# Patient Record
Sex: FEMALE | Race: BLACK OR AFRICAN AMERICAN | NOT HISPANIC OR LATINO | Employment: FULL TIME | URBAN - METROPOLITAN AREA
[De-identification: names, ages, dates, MRNs, and addresses within clinical notes are randomized per-mention and may not be internally consistent; named-entity substitution may affect disease eponyms.]

---

## 2021-12-23 ENCOUNTER — HISTORICAL (OUTPATIENT)
Dept: ADMINISTRATIVE | Facility: HOSPITAL | Age: 61
End: 2021-12-23

## 2021-12-23 LAB — SARS-COV-2 RNA RESP QL NAA+PROBE: NOT DETECTED

## 2021-12-30 ENCOUNTER — HISTORICAL (OUTPATIENT)
Dept: ADMINISTRATIVE | Facility: HOSPITAL | Age: 61
End: 2021-12-30

## 2021-12-30 LAB — SARS-COV-2 RNA RESP QL NAA+PROBE: NOT DETECTED

## 2022-07-23 ENCOUNTER — HOSPITAL ENCOUNTER (EMERGENCY)
Facility: HOSPITAL | Age: 62
Discharge: HOME | End: 2022-07-23
Attending: EMERGENCY MEDICINE
Payer: MEDICAID

## 2022-07-23 VITALS
RESPIRATION RATE: 18 BRPM | BODY MASS INDEX: 20.42 KG/M2 | DIASTOLIC BLOOD PRESSURE: 73 MMHG | HEART RATE: 81 BPM | WEIGHT: 104 LBS | SYSTOLIC BLOOD PRESSURE: 113 MMHG | HEIGHT: 60 IN | OXYGEN SATURATION: 97 % | TEMPERATURE: 98 F

## 2022-07-23 DIAGNOSIS — J98.8 VIRAL RESPIRATORY ILLNESS: Primary | ICD-10-CM

## 2022-07-23 DIAGNOSIS — B97.89 VIRAL RESPIRATORY ILLNESS: Primary | ICD-10-CM

## 2022-07-23 DIAGNOSIS — J34.89 TENDERNESS OVER FRONTAL SINUS: ICD-10-CM

## 2022-07-23 LAB
FLUAV AG UPPER RESP QL IA.RAPID: NOT DETECTED
FLUBV AG UPPER RESP QL IA.RAPID: NOT DETECTED
SARS-COV-2 RNA RESP QL NAA+PROBE: NOT DETECTED

## 2022-07-23 PROCEDURE — 87636 SARSCOV2 & INF A&B AMP PRB: CPT | Performed by: EMERGENCY MEDICINE

## 2022-07-23 PROCEDURE — 99282 EMERGENCY DEPT VISIT SF MDM: CPT

## 2022-07-23 NOTE — ED PROVIDER NOTES
Encounter Date: 7/23/2022       History     Chief Complaint   Patient presents with    COVID-19 Concerns     Pt c/o fever, sinus congestion, h/a and bodyaches onset 5 days ago.     61 year old female with flu like symptoms since a few days ago comes in to rule out covid. She states she was exposed a few days ago. She had a fever and a bad headache.         Review of patient's allergies indicates:   Allergen Reactions    Iodine     Penicillins Rash     No past medical history on file.  No past surgical history on file.  No family history on file.     Review of Systems   Constitutional: Negative for fever.   HENT: Positive for congestion, postnasal drip and sinus pressure. Negative for ear discharge, ear pain and sore throat.    Respiratory: Negative for shortness of breath.    Cardiovascular: Negative for chest pain.   Gastrointestinal: Negative for nausea.   Genitourinary: Negative for dysuria.   Musculoskeletal: Negative for back pain.   Skin: Negative for rash.   Neurological: Negative for weakness.   Hematological: Does not bruise/bleed easily.       Physical Exam     Initial Vitals [07/23/22 1221]   BP Pulse Resp Temp SpO2   113/73 81 18 97.5 °F (36.4 °C) 97 %      MAP       --         Physical Exam    Nursing note and vitals reviewed.  Constitutional: She appears well-developed and well-nourished.   HENT:   Right Ear: Tympanic membrane normal. No tenderness. Tympanic membrane is not perforated, not retracted and not bulging.   Left Ear: Tympanic membrane normal. No tenderness. Tympanic membrane is not retracted.   Cardiovascular: Normal rate.   Pulmonary/Chest: Breath sounds normal. No respiratory distress. She has no wheezes. She has no rales.     Neurological: She is alert and oriented to person, place, and time.   Skin: Skin is warm.   Psychiatric: She has a normal mood and affect. Thought content normal.         ED Course   Procedures  Labs Reviewed   COVID/FLU A&B PCR - Normal          Imaging Results     None          Medications - No data to display  Medical Decision Making:   Initial Assessment:   Flue like symptoms    Differential Diagnosis:   Viral illness    Clinical Tests:   Lab Tests: Reviewed             ED Course as of 07/23/22 1347   Sat Jul 23, 2022   1346 Influenza A, Molecular: Not Detected [YG]   1346 Influenza B, Molecular: Not Detected [YG]   1346 SARS-CoV2 (COVID-19) Qualitative PCR: Not Detected [YG]      ED Course User Index  [YG] ANDERS Bucio             Clinical Impression:   Final diagnoses:  [J98.8, B97.89] Viral respiratory illness (Primary)  [J34.89] Tenderness over frontal sinus          ED Disposition Condition    Discharge Stable        ED Prescriptions     None        Follow-up Information     Follow up With Specialties Details Why Contact Info    [facility] - Emergency Dept Emergency Medicine  If symptoms worsen [phone]  [facility] [id number] [phone]           ANDERS Bucio  07/23/22 5395

## 2023-03-25 ENCOUNTER — HOSPITAL ENCOUNTER (EMERGENCY)
Facility: HOSPITAL | Age: 63
Discharge: HOME | End: 2023-03-25
Attending: EMERGENCY MEDICINE
Payer: MEDICAID

## 2023-03-25 VITALS
RESPIRATION RATE: 18 BRPM | DIASTOLIC BLOOD PRESSURE: 86 MMHG | HEIGHT: 60 IN | HEART RATE: 96 BPM | WEIGHT: 110 LBS | TEMPERATURE: 100 F | BODY MASS INDEX: 21.6 KG/M2 | SYSTOLIC BLOOD PRESSURE: 140 MMHG | OXYGEN SATURATION: 98 %

## 2023-03-25 DIAGNOSIS — J06.9 UPPER RESPIRATORY TRACT INFECTION, UNSPECIFIED TYPE: ICD-10-CM

## 2023-03-25 DIAGNOSIS — H61.22 HEARING LOSS DUE TO CERUMEN IMPACTION, LEFT: Primary | ICD-10-CM

## 2023-03-25 PROCEDURE — 0240U COVID/FLU A&B PCR: CPT | Performed by: EMERGENCY MEDICINE

## 2023-03-25 PROCEDURE — 99282 EMERGENCY DEPT VISIT SF MDM: CPT

## 2023-03-25 RX ORDER — CYPROHEPTADINE HYDROCHLORIDE 4 MG/1
4 TABLET ORAL 2 TIMES DAILY
COMMUNITY
Start: 2023-03-24

## 2023-03-25 RX ORDER — AMLODIPINE BESYLATE 2.5 MG/1
2.5 TABLET ORAL
COMMUNITY
Start: 2023-03-21

## 2023-03-25 RX ORDER — IBUPROFEN 600 MG/1
600 TABLET ORAL 3 TIMES DAILY
COMMUNITY
Start: 2023-02-02 | End: 2024-01-10 | Stop reason: SDUPTHER

## 2023-03-25 RX ORDER — ERGOCALCIFEROL 1.25 MG/1
50000 CAPSULE ORAL
COMMUNITY
Start: 2023-03-21

## 2023-03-25 NOTE — Clinical Note
"Becky"Mercedes Herndon was seen and treated in our emergency department on 3/25/2023.  She may return to work on 03/28/2023.       If you have any questions or concerns, please don't hesitate to call.      Minal Escalera MD"

## 2023-03-26 NOTE — ED PROVIDER NOTES
Encounter Date: 3/25/2023       History     Chief Complaint   Patient presents with    Cough     Cough [place]ce x1 week      62-year-old female complains of a one-week history of cough, chest congestion, body aches.  She has had no fever.  No sore throat or runny nose.  No ear pain.  She states that she quit smoking about a week and a half ago and thinks that she is having withdrawal symptoms.      Review of patient's allergies indicates:   Allergen Reactions    Iodine     Penicillins Rash     Past Medical History:   Diagnosis Date    Hypertension      Past Surgical History:   Procedure Laterality Date    cyst on vocal chord       No family history on file.  Social History     Tobacco Use    Smoking status: Every Day     Types: Cigarettes    Smokeless tobacco: Never   Substance Use Topics    Alcohol use: Yes    Drug use: Never     Review of Systems   HENT:  Positive for congestion.    Respiratory:  Positive for cough.    Musculoskeletal:  Positive for myalgias.   All other systems reviewed and are negative.    Physical Exam     Initial Vitals [03/25/23 2103]   BP Pulse Resp Temp SpO2   (!) 140/86 96 18 99.9 °F (37.7 °C) 98 %      MAP       --         Physical Exam    Nursing note and vitals reviewed.  Constitutional: She appears well-developed and well-nourished. She is not diaphoretic. No distress.   HENT:   Head: Normocephalic and atraumatic.   Mouth/Throat: Oropharynx is clear and moist.   Right TM is normal, left TM not visualized due to cerumen impaction   Eyes: Conjunctivae are normal. Pupils are equal, round, and reactive to light.   Neck: Neck supple.   Cardiovascular:  Normal rate, regular rhythm, normal heart sounds and intact distal pulses.           Pulmonary/Chest: Breath sounds normal. No respiratory distress. She has no wheezes. She has no rhonchi. She has no rales.   Abdominal: Abdomen is soft. Bowel sounds are normal. She exhibits no distension. There is no abdominal tenderness. There  is no guarding.   Musculoskeletal:         General: No tenderness or edema. Normal range of motion.      Cervical back: Neck supple.     Neurological: She is alert and oriented to person, place, and time.   Skin: Skin is warm and dry. Capillary refill takes less than 2 seconds. No rash noted.   Psychiatric: She has a normal mood and affect. Thought content normal.       ED Course   Procedures  Labs Reviewed   COVID/FLU A&B PCR - Normal    Narrative:     The Xpert Xpress SARS-CoV-2/FLU/RSV plus is a rapid, multiplexed real-time PCR test intended for the simultaneous qualitative detection and differentiation of SARS-CoV-2, Influenza A, Influenza B, and respiratory syncytial virus (RSV) viral RNA in either nasopharyngeal swab or nasal swab specimens.                Imaging Results    None          Medications - No data to display  Medical Decision Making:   Initial Assessment:   62-year-old female complains of a one-week history of cough, chest congestion, body aches.  She has had no fever.  No sore throat or runny nose.  No ear pain.  She states that she quit smoking about a week and a half ago and thinks that she is having withdrawal symptoms.    Differential Diagnosis:   Differential diagnosis includes but is not limited to URI, nicotine withdrawal, COVID, flu  Clinical Tests:   Lab Tests: Reviewed  ED Management:  Patient was seen and evaluated in the emergency room with history, physical exam, COVID test, flu test.  Her exam is benign other than a cerumen impaction on the left.  COVID and flu test are negative.                        Clinical Impression:   Final diagnoses:  [H61.22] Hearing loss due to cerumen impaction, left (Primary)  [J06.9] Upper respiratory tract infection, unspecified type        ED Disposition Condition    Discharge Stable          ED Prescriptions       Medication Sig Dispense Start Date End Date Auth. Provider    carbamide peroxide (DEBROX) 6.5 % otic solution Place 5 drops into the left  ear as needed (ear wax obstruction). 15 mL 3/25/2023 4/1/2023 Minal Escalera MD          Follow-up Information       Follow up With Specialties Details Why Contact Info    PCP  Schedule an appointment as soon as possible for a visit                Minal Escalera MD  03/26/23 4183

## 2023-05-25 ENCOUNTER — HOSPITAL ENCOUNTER (EMERGENCY)
Facility: HOSPITAL | Age: 63
Discharge: HOME | End: 2023-05-25
Attending: EMERGENCY MEDICINE
Payer: MEDICAID

## 2023-05-25 VITALS
SYSTOLIC BLOOD PRESSURE: 115 MMHG | DIASTOLIC BLOOD PRESSURE: 69 MMHG | BODY MASS INDEX: 23.56 KG/M2 | HEIGHT: 60 IN | WEIGHT: 120 LBS | OXYGEN SATURATION: 97 % | TEMPERATURE: 97 F | RESPIRATION RATE: 18 BRPM | HEART RATE: 78 BPM

## 2023-05-25 DIAGNOSIS — J06.9 VIRAL URI WITH COUGH: Primary | ICD-10-CM

## 2023-05-25 PROCEDURE — 99284 EMERGENCY DEPT VISIT MOD MDM: CPT

## 2023-05-25 PROCEDURE — 0240U COVID/FLU A&B PCR: CPT | Performed by: EMERGENCY MEDICINE

## 2023-05-25 RX ORDER — AZELASTINE 1 MG/ML
1 SPRAY, METERED NASAL 2 TIMES DAILY
Qty: 30 ML | Refills: 0 | Status: SHIPPED | OUTPATIENT
Start: 2023-05-25 | End: 2024-05-24

## 2023-05-25 RX ORDER — CHLOPHEDIANOL HCL AND PYRILAMINE MALEATE 12.5; 12.5 MG/5ML; MG/5ML
5 SOLUTION ORAL 2 TIMES DAILY PRN
Qty: 120 ML | Refills: 0 | Status: SHIPPED | OUTPATIENT
Start: 2023-05-25

## 2023-05-25 NOTE — ED PROVIDER NOTES
Encounter Date: 5/25/2023       History     Chief Complaint   Patient presents with    Cough     Pt to er c/o cough and congestion onset 3 weeks ago, along with h/a.     62-year-old female complains of a 2 week history of cough, nasal congestion, left ear pain, chest congestion.  She says she saw her PCP but I do not see any prescription on her medication history.  She checked into the emergency room with her 6 grandchildren who all have the same symptoms.  She denies fever, chills, chest pain, shortness of breath, nausea, vomiting, malaise.  Appetite is normal.      Review of patient's allergies indicates:   Allergen Reactions    Iodine     Penicillins Rash     Past Medical History:   Diagnosis Date    Hypertension      Past Surgical History:   Procedure Laterality Date    cyst on vocal chord       No family history on file.  Social History     Tobacco Use    Smoking status: Every Day     Types: Cigarettes    Smokeless tobacco: Never   Substance Use Topics    Alcohol use: Yes    Drug use: Never     Review of Systems   HENT:  Positive for congestion and ear pain.    Respiratory:  Positive for cough.    All other systems reviewed and are negative.    Physical Exam     Initial Vitals [05/25/23 0906]   BP Pulse Resp Temp SpO2   115/69 78 18 97 °F (36.1 °C) 97 %      MAP       --         Physical Exam    Nursing note and vitals reviewed.  Constitutional: She appears well-developed and well-nourished. She is not diaphoretic. No distress.   HENT:   Head: Normocephalic and atraumatic.   Mouth/Throat: Oropharynx is clear and moist.   Right TM is normal, left ear has a cerumen impaction   Eyes: Conjunctivae are normal. Pupils are equal, round, and reactive to light.   Neck: Neck supple.   Cardiovascular:  Normal rate, regular rhythm, normal heart sounds and intact distal pulses.           Pulmonary/Chest: Breath sounds normal. No respiratory distress. She has no wheezes. She has no rhonchi. She has no rales.   Abdominal:  Abdomen is soft. Bowel sounds are normal. She exhibits no distension. There is no abdominal tenderness. There is no guarding.   Musculoskeletal:         General: No tenderness or edema. Normal range of motion.      Cervical back: Neck supple.     Neurological: She is alert and oriented to person, place, and time.   Skin: Skin is warm and dry. Capillary refill takes less than 2 seconds. No rash noted.   Psychiatric: She has a normal mood and affect. Thought content normal.       ED Course   Procedures  Labs Reviewed   COVID/FLU A&B PCR - Normal    Narrative:     The Xpert Xpress SARS-CoV-2/FLU/RSV plus is a rapid, multiplexed real-time PCR test intended for the simultaneous qualitative detection and differentiation of SARS-CoV-2, Influenza A, Influenza B, and respiratory syncytial virus (RSV) viral RNA in either nasopharyngeal swab or nasal swab specimens.                Imaging Results    None          Medications - No data to display  Medical Decision Making:   Initial Assessment:   62-year-old female complains of a 2 week history of cough, nasal congestion, left ear pain, chest congestion.  She says she saw her PCP but I do not see any prescription on her medication history.  She checked into the emergency room with her 6 grandchildren who all have the same symptoms.  She denies fever, chills, chest pain, shortness of breath, nausea, vomiting, malaise.  Appetite is normal.      Differential Diagnosis:   Differential diagnosis includes but is not limited to viral syndrome, cerumen impaction, ear infection, sinusitis  Clinical Tests:   Lab Tests: Reviewed  ED Management:  Patient was seen and evaluated in the emergency department with history, physical exam, testing for COVID, flu, RSV.  She was found to have a cerumen impaction on the left which was noted during her previous ER visit in March.  I will give her prescription for Debrox and recommend that she follow-up with her PCP for further care.  I will also give  her symptomatic medications for her upper respiratory infection.  I discussed with her reasons to return to the emergency department versus going to her PCP for care.                        Clinical Impression:   Final diagnoses:  [J06.9] Viral URI with cough (Primary)        ED Disposition Condition    Discharge Stable          ED Prescriptions       Medication Sig Dispense Start Date End Date Auth. Provider    pyrilamine-chlophedianoL (NINJACOF) 12.5-12.5 mg/5 mL Liqd Take 5 mLs by mouth 2 (two) times daily as needed (cough). 120 mL 5/25/2023 -- Minal Escalera MD    azelastine (ASTELIN) 137 mcg (0.1 %) nasal spray 1 spray (137 mcg total) by Nasal route 2 (two) times daily. 30 mL 5/25/2023 5/24/2024 Minal Escalera MD    carbamide peroxide (DEBROX) 6.5 % otic solution Place 5 drops into the left ear 2 (two) times daily. for 7 days 15 mL 5/25/2023 6/1/2023 Minal Escalera MD          Follow-up Information       Follow up With Specialties Details Why Contact Info    [facility]  As needed [address] [facility] [address] [phone]               Minal Escalera MD  05/26/23 0164

## 2023-07-03 ENCOUNTER — HOSPITAL ENCOUNTER (EMERGENCY)
Facility: HOSPITAL | Age: 63
Discharge: HOME | End: 2023-07-03
Attending: STUDENT IN AN ORGANIZED HEALTH CARE EDUCATION/TRAINING PROGRAM
Payer: MEDICAID

## 2023-07-03 VITALS
SYSTOLIC BLOOD PRESSURE: 140 MMHG | WEIGHT: 102 LBS | DIASTOLIC BLOOD PRESSURE: 87 MMHG | OXYGEN SATURATION: 97 % | RESPIRATION RATE: 18 BRPM | HEART RATE: 70 BPM | BODY MASS INDEX: 20.03 KG/M2 | HEIGHT: 60 IN | TEMPERATURE: 99 F

## 2023-07-03 DIAGNOSIS — T63.441A BEE STING, ACCIDENTAL OR UNINTENTIONAL, INITIAL ENCOUNTER: Primary | ICD-10-CM

## 2023-07-03 PROCEDURE — 99282 EMERGENCY DEPT VISIT SF MDM: CPT

## 2023-07-04 NOTE — ED PROVIDER NOTES
Encounter Date: 7/3/2023       History     Chief Complaint   Patient presents with    Insect Bite     HPI    62-year-old female with a past medical history of hypertension presents emergency department after getting bit by a bug.  Patient was not sure exactly with the bug was so came into emergency department to make sure it was not poison this.  She states that she never got bit by this type insect before.  She thinks it to be but never seen one before.  States it better in the right axilla.  No pain or swelling.    Review of patient's allergies indicates:   Allergen Reactions    Iodine     Penicillins Rash     Past Medical History:   Diagnosis Date    Hypertension      Past Surgical History:   Procedure Laterality Date    cyst on vocal chord      THROAT SURGERY      cyst on vocal chord     No family history on file.  Social History     Tobacco Use    Smoking status: Every Day     Types: Cigarettes    Smokeless tobacco: Never   Substance Use Topics    Alcohol use: Yes    Drug use: Never     Review of Systems   Constitutional:  Negative for fever.   Respiratory:  Negative for cough, shortness of breath and wheezing.    Cardiovascular:  Negative for chest pain.   Gastrointestinal:  Negative for abdominal pain.   Skin:         Bug bite     Physical Exam     Initial Vitals [07/03/23 2158]   BP Pulse Resp Temp SpO2   (!) 140/87 70 18 98.5 °F (36.9 °C) 97 %      MAP       --         Physical Exam    Nursing note and vitals reviewed.  Constitutional: She appears well-developed and well-nourished. No distress.   Cardiovascular:  Normal rate and regular rhythm.           Pulmonary/Chest: Breath sounds normal. No respiratory distress.   Abdominal: Abdomen is soft. There is no abdominal tenderness.   Musculoskeletal:         General: No tenderness. Normal range of motion.     Neurological: She is alert.   Skin: Skin is warm. Capillary refill takes less than 2 seconds.   Small bug bite noted to the right axilla with mild  inflammatory changes.  No fluctuance or induration.  No residual stinger   Psychiatric: She has a normal mood and affect. Thought content normal.       ED Course   Procedures  Labs Reviewed - No data to display       Imaging Results    None          Medications - No data to display  Medical Decision Making:   Differential Diagnosis:   Bite, wasp sitting, bee sting, local reaction               Medical Decision Making  Problems Addressed:  Bee sting, accidental or unintentional, initial encounter: self-limited or minor problem    Amount and/or Complexity of Data Reviewed  External Data Reviewed: notes.               Clinical Impression:   Final diagnoses:  [T63.441A] Bee sting, accidental or unintentional, initial encounter (Primary)        ED Disposition Condition    Discharge Stable          ED Prescriptions    None       Follow-up Information       Follow up With Specialties Details Why Contact Info    [facility] Schedule an appointment as soon as possible for a visit   [address] [facility] [address] [phone]      [facility] - Emergency Dept Emergency Medicine Go to  If symptoms worsen [address]  [facility] [address] [phone]             Ramu Carrasco MD  07/03/23 6002

## 2023-12-20 ENCOUNTER — HOSPITAL ENCOUNTER (EMERGENCY)
Facility: HOSPITAL | Age: 63
Discharge: HOME | End: 2023-12-20
Attending: FAMILY MEDICINE
Payer: MEDICAID

## 2023-12-20 VITALS
OXYGEN SATURATION: 98 % | BODY MASS INDEX: 20.62 KG/M2 | DIASTOLIC BLOOD PRESSURE: 80 MMHG | TEMPERATURE: 98 F | SYSTOLIC BLOOD PRESSURE: 145 MMHG | HEART RATE: 88 BPM | HEIGHT: 60 IN | WEIGHT: 105 LBS | RESPIRATION RATE: 18 BRPM

## 2023-12-20 DIAGNOSIS — Z98.890 POST-OPERATIVE STATE: Primary | ICD-10-CM

## 2023-12-20 PROCEDURE — 99282 EMERGENCY DEPT VISIT SF MDM: CPT

## 2023-12-21 NOTE — ED PROVIDER NOTES
Encounter Date: 12/20/2023       History     Chief Complaint   Patient presents with    Post-op Problem     Pt here with c/o swelling to L side of her abd and pain. Pt had R sided inguinal hernia sx by Dr. Fagan at [facility] on 12/13. She states she has been fine until today. No meds taken today for pain. Sx sites appear to be healing well. No fevers at home. She states she just wants a dr to look at her abd and tell her if it's normal.       Patient is a 63-year-old female presents emergency room for evaluation with complaints of left upper quadrant abdominal discomfort/ bulge feeling.  Patient is status post robotic laparoscopic right inguinal hernia repair performed at [facility] on 12/13/2023 ( 7 days prior).  Patient reports that her postoperative course has gone well, with eating and drinking normally, and also having normal bowel movements, last bowel movement today.  Denies dysuria or hematuria.  Denies fever chills.    The history is provided by the patient.     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 Fagan    THROAT SURGERY      cyst on vocal chord     History reviewed. No pertinent family history.  Social History     Tobacco Use    Smoking status: Every Day     Current packs/day: 0.50     Types: Cigarettes    Smokeless tobacco: Never   Substance Use Topics    Alcohol use: Yes    Drug use: Never     Review of Systems   Constitutional:  Negative for chills, fatigue and fever.   HENT:  Negative for ear pain, rhinorrhea and sore throat.    Eyes:  Negative for photophobia and pain.   Respiratory:  Negative for cough, shortness of breath and wheezing.    Cardiovascular:  Negative for chest pain.   Gastrointestinal:  Negative for  abdominal pain, diarrhea, nausea and vomiting.   Genitourinary:  Negative for dysuria.   Neurological:  Negative for dizziness, weakness and headaches.   All other systems reviewed and are negative.      Physical Exam     Initial Vitals [12/20/23 1956]   BP Pulse Resp Temp SpO2   (!) 145/80 88 18 98.4 °F (36.9 °C) 98 %      MAP       --         Physical Exam    Nursing note and vitals reviewed.  Constitutional: She appears well-developed and well-nourished. No distress.   HENT:   Head: Normocephalic and atraumatic.   Eyes: Conjunctivae and EOM are normal. Pupils are equal, round, and reactive to light.   Neck: Neck supple.   Normal range of motion.  Cardiovascular:  Normal rate, regular rhythm, normal heart sounds and intact distal pulses.           Pulmonary/Chest: Breath sounds normal. No respiratory distress. She has no wheezes. She has no rhonchi. She has no rales.   Abdominal: Abdomen is soft. Bowel sounds are normal. She exhibits no distension. There is no abdominal tenderness.     Patient has 3 laparoscopic scars which are clean dry and intact.  Patient has mild tenderness to palpation of the right lower quadrant which should be expected from surgery, but otherwise benign abdomen.  No tenderness to palpation or discomfort in the left upper quadrant where the patient is complaining of sensation of a bulge. There is no rebound and no guarding.   Musculoskeletal:         General: Normal range of motion.      Cervical back: Normal range of motion and neck supple.     Neurological: She is alert and oriented to person, place, and time.   Skin: Skin is warm and dry. Capillary refill takes less than 2 seconds. No erythema.   Psychiatric: She has a normal mood and affect. Her behavior is normal. Judgment and thought content normal.         ED Course   Procedures  Labs Reviewed - No data to display       Imaging Results    None          Medications - No data to display  Medical Decision Making    Patient is a  63-year-old female presents emergency room in the postoperative state, postop day 7  for evaluation of a bulge type sensation in the left upper quadrant.  Physical examination is benign.  Will reach out to patient's surgeon due to patient being in the postoperative state.  Patient reports that she will call the office tomorrow in order to schedule follow-up with the surgeon.               ED Course as of 12/20/23 2025   Wed Dec 20, 2023   2016 Called Dr. Gracia Angeles to inform her that the patient was in the ED.  Stable for discharge to home.  Patient will call Dr. Gracia Angeles office in the morning to arrange followup.  Er precautions given for any acute worsening. [MW]      ED Course User Index  [MW] Rafael Graham MD                           Clinical Impression:  Final diagnoses:  [Z98.890] Post-operative state (Primary)          ED Disposition Condition    Discharge Stable          ED Prescriptions    None       Follow-up Information       Follow up With Specialties Details Why Contact Info    ANITA Hercules MD General Surgery Call  Call tomorrow to schedule appointment. [address] [facility] [address]  [phone]      [facility] - Emergency Dept Emergency Medicine  As needed, If symptoms worsen [address] [facility] [id number] [phone]    Candice Rowan, [facility]   [address] [place] [id number] [phone]               Rafael Graham MD  12/20/23 2025

## 2024-01-10 ENCOUNTER — HOSPITAL ENCOUNTER (EMERGENCY)
Facility: HOSPITAL | Age: 64
Discharge: HOME | End: 2024-01-10
Attending: FAMILY MEDICINE
Payer: MEDICAID

## 2024-01-10 VITALS
RESPIRATION RATE: 18 BRPM | DIASTOLIC BLOOD PRESSURE: 76 MMHG | SYSTOLIC BLOOD PRESSURE: 120 MMHG | TEMPERATURE: 99 F | HEART RATE: 82 BPM | OXYGEN SATURATION: 98 % | HEIGHT: 60 IN | BODY MASS INDEX: 21.01 KG/M2 | WEIGHT: 107 LBS

## 2024-01-10 DIAGNOSIS — K02.9 DENTAL CARIES: ICD-10-CM

## 2024-01-10 DIAGNOSIS — K04.7 DENTAL ABSCESS: Primary | ICD-10-CM

## 2024-01-10 PROCEDURE — 63600175 PHARM REV CODE 636 W HCPCS: Performed by: FAMILY MEDICINE

## 2024-01-10 PROCEDURE — 99284 EMERGENCY DEPT VISIT MOD MDM: CPT

## 2024-01-10 PROCEDURE — 96372 THER/PROPH/DIAG INJ SC/IM: CPT | Performed by: FAMILY MEDICINE

## 2024-01-10 RX ORDER — KETOROLAC TROMETHAMINE 30 MG/ML
30 INJECTION, SOLUTION INTRAMUSCULAR; INTRAVENOUS
Status: COMPLETED | OUTPATIENT
Start: 2024-01-10 | End: 2024-01-10

## 2024-01-10 RX ORDER — CLINDAMYCIN HYDROCHLORIDE 300 MG/1
300 CAPSULE ORAL EVERY 6 HOURS
Qty: 28 CAPSULE | Refills: 0 | Status: SHIPPED | OUTPATIENT
Start: 2024-01-10 | End: 2024-01-17

## 2024-01-10 RX ORDER — KETOROLAC TROMETHAMINE 10 MG/1
10 TABLET, FILM COATED ORAL EVERY 6 HOURS
Qty: 20 TABLET | Refills: 0 | Status: SHIPPED | OUTPATIENT
Start: 2024-01-10 | End: 2024-01-15

## 2024-01-10 RX ADMIN — KETOROLAC TROMETHAMINE 30 MG: 30 INJECTION, SOLUTION INTRAMUSCULAR; INTRAVENOUS at 11:01

## 2024-01-10 NOTE — ED PROVIDER NOTES
Encounter Date: 1/10/2024       History     Chief Complaint   Patient presents with    Dental Pain     Pt complaint of left dental pain with swelling radiating to left ear     The history is provided by the patient. No  was used.   Dental Pain  The primary symptoms include mouth pain. The symptoms began several days ago. The symptoms are unchanged. The symptoms are new. The symptoms occur constantly.   Additional symptoms include: gum swelling and ear pain. Additional symptoms do not include: dental sensitivity to temperature, purulent gums, trismus, jaw pain, facial swelling, trouble swallowing, pain with swallowing, excessive salivation, dry mouth, taste disturbance, smell disturbance, drooling, hearing loss, nosebleeds, swollen glands and fatigue.   States she called her PCP but was unable to obtain an appointment today.  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 Fagan    THROAT SURGERY      cyst on vocal chord     No family history on file.  Social History     Tobacco Use    Smoking status: Every Day     Current packs/day: 0.50     Types: Cigarettes    Smokeless tobacco: Never   Substance Use Topics    Alcohol use: Yes    Drug use: Never     Review of Systems   Constitutional:  Negative for fatigue.   HENT:  Positive for dental problem and ear pain. Negative for drooling, facial swelling, hearing loss, nosebleeds and trouble swallowing.    All other systems reviewed and are negative.      Physical Exam     Initial Vitals [01/10/24 1039]   BP Pulse Resp Temp SpO2   120/76 82 18 98.6 °F (37 °C) 98 %      MAP       --         Physical Exam    Nursing note and vitals reviewed.  Constitutional: She appears well-developed and well-nourished.    HENT:   Head: Normocephalic and atraumatic.   Right Ear: Hearing, tympanic membrane, external ear and ear canal normal.   Left Ear: Hearing, tympanic membrane, external ear and ear canal normal.   Mouth/Throat: Uvula is midline, oropharynx is clear and moist and mucous membranes are normal. No trismus in the jaw. Dental abscesses and dental caries present. No uvula swelling.       Eyes: Conjunctivae and EOM are normal. Pupils are equal, round, and reactive to light.   Neck: Neck supple.   Normal range of motion.  Cardiovascular:  Normal rate, regular rhythm, normal heart sounds and intact distal pulses.           No murmur heard.  Pulmonary/Chest: Breath sounds normal. No respiratory distress. She has no wheezes. She has no rhonchi. She has no rales.   Abdominal: Abdomen is soft. Bowel sounds are normal. She exhibits no distension. There is no abdominal tenderness. There is no rebound.   Musculoskeletal:         General: Normal range of motion.      Cervical back: Normal range of motion and neck supple.     Lymphadenopathy:     She has no cervical adenopathy.   Neurological: She is alert and oriented to person, place, and time. GCS score is 15. GCS eye subscore is 4. GCS verbal subscore is 5. GCS motor subscore is 6.   Skin: Skin is warm and dry. Capillary refill takes less than 2 seconds.         ED Course   Procedures  Labs Reviewed - No data to display       Imaging Results    None          Medications   ketorolac injection 30 mg (30 mg Intramuscular Given 1/10/24 1102)     Medical Decision Making  63-year-old female who presents for left lower dental pain, swelling, and left ear pain for the past 4-5 days.  States she tried to see her primary care doctor today but was unable to obtain an appointment.  No difficulty swallowing or eating.  Differential diagnoses including but not limited to otitis media, otitis externa, dental caries, dental abscess.    Risk  Prescription drug management.               ED Course  as of 01/10/24 1123   Wed Sung 10, 2024   1121 Pain improved with Toradol.  Discussed diagnoses of dental abscess and dental caries.  Prescriptions for clindamycin and Toradol on discharge.  Patient is allergic to penicillins.  Patient will need to follow up with PCP and dentist outpatient.  Patient voiced understanding and agreement with plan to discharge home. [DEANDRE]      ED Course User Index  [DEANDRE] Gabriel Darden MD                           Clinical Impression:  Final diagnoses:  [K04.7] Dental abscess (Primary)  [K02.9] Dental caries          ED Disposition Condition    Discharge Stable          ED Prescriptions       Medication Sig Dispense Start Date End Date Auth. Provider    ketorolac (TORADOL) 10 mg tablet Take 1 tablet (10 mg total) by mouth every 6 (six) hours. for 5 days 20 tablet 1/10/2024 1/15/2024 Gabriel Darden MD    clindamycin (CLEOCIN) 300 MG capsule Take 1 capsule (300 mg total) by mouth every 6 (six) hours. for 7 days 28 capsule 1/10/2024 1/17/2024 Gabriel Darden MD          Follow-up Information       Follow up With Specialties Details Why Contact Info    Candice Rowan FNP Family Medicine In 2 days  [address] [facility] [address] [phone]               Gabriel Darden MD  01/10/24 1123

## 2024-05-04 ENCOUNTER — HOSPITAL ENCOUNTER (EMERGENCY)
Facility: HOSPITAL | Age: 64
Discharge: HOME | End: 2024-05-04
Attending: EMERGENCY MEDICINE
Payer: MEDICAID

## 2024-05-04 VITALS
BODY MASS INDEX: 21.01 KG/M2 | HEIGHT: 60 IN | TEMPERATURE: 98 F | DIASTOLIC BLOOD PRESSURE: 80 MMHG | OXYGEN SATURATION: 99 % | HEART RATE: 77 BPM | RESPIRATION RATE: 18 BRPM | WEIGHT: 107 LBS | SYSTOLIC BLOOD PRESSURE: 141 MMHG

## 2024-05-04 DIAGNOSIS — S62.346A CLOSED NONDISPLACED FRACTURE OF BASE OF FIFTH METACARPAL BONE OF RIGHT HAND, INITIAL ENCOUNTER: Primary | ICD-10-CM
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
## 2024-05-04 PROCEDURE — 29125 APPL SHORT ARM SPLINT STATIC: CPT | Mod: RT

## 2024-05-04 PROCEDURE — 99283 EMERGENCY DEPT VISIT LOW MDM: CPT | Mod: 25

## 2024-05-04 NOTE — ED PROVIDER NOTES
Encounter Date: 5/4/2024       History     Chief Complaint   Patient presents with    Arm Pain     Pt to er c/o pain to right arm and hand s/p fall two weeks ago.     63-year-old female fell on her right arm about 2 weeks ago.  She states she has been trying to work and really trying to use her hand but the pain is limiting her.  She did not seek medical care because she thought she just had bruising and it would go away.  Most of her pain is proximal right 5th metacarpal region.  However she is hurting from her elbow all the way down to her fingers.  She denies head or neck injury.  She denies weakness or numbness.        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 Fagan    THROAT SURGERY      cyst on vocal chord     No family history on file.  Social History     Tobacco Use    Smoking status: Every Day     Current packs/day: 0.50     Types: Cigarettes    Smokeless tobacco: Never   Substance Use Topics    Alcohol use: Yes    Drug use: Never     Review of Systems   Musculoskeletal:  Positive for arthralgias.   All other systems reviewed and are negative.      Physical Exam     Initial Vitals [05/04/24 0850]   BP Pulse Resp Temp SpO2   (!) 141/80 77 18 97.7 °F (36.5 °C) 99 %      MAP       --         Physical Exam    Nursing note and vitals reviewed.  Constitutional: She appears well-developed and well-nourished. She is not diaphoretic. No distress.   HENT:   Head: Normocephalic and atraumatic.   Mouth/Throat: Oropharynx is clear and moist.   Eyes: Conjunctivae are normal. Pupils are equal, round, and reactive to light.   Neck: Neck supple.   Cardiovascular:  Normal rate, regular rhythm, normal heart sounds and intact distal pulses.           Pulmonary/Chest:  Breath sounds normal. No respiratory distress. She has no wheezes. She has no rhonchi. She has no rales.   Abdominal: Abdomen is soft. She exhibits no distension. There is no abdominal tenderness. There is no guarding.   Musculoskeletal:      Cervical back: Neck supple.      Comments: Right shoulder has full range of motion with no tenderness or pain.  Right elbow has full range of motion but has diffuse tenderness, no point tenderness, no swelling or dislocation.  Nontender to the forearm.  Nontender to the wrist other than the ulnar styloid.  Tender to the proximal 4th and 5th metacarpal region.  Full range of motion but complains of pain.     Neurological: She is alert and oriented to person, place, and time.   Skin: Skin is warm and dry. Capillary refill takes less than 2 seconds. No rash noted.   Psychiatric: She has a normal mood and affect. Thought content normal.         ED Course   Splint Application    Date/Time: 5/4/2024 6:02 PM    Performed by: Minal Escalera MD  Authorized by: Minal Escalera MD  Consent Done: Yes  Consent: Verbal consent obtained.  Risks and benefits: risks, benefits and alternatives were discussed  Consent given by: patient  Patient understanding: patient states understanding of the procedure being performed  Patient identity confirmed: verbally with patient  Location details: right wrist  Splint type: Boxer's fracture velcro splint.  Supplies used: Velcro splint.  Post-procedure: The splinted body part was neurovascularly unchanged following the procedure.  Patient tolerance: Patient tolerated the procedure well with no immediate complications        Labs Reviewed - No data to display       Imaging Results              X-Ray Wrist Complete Right (Final result)  Result time 05/04/24 11:11:14      Final result by Harpal Holder MD (05/04/24 11:11:14)                   Impression:      Degenerative changes at the 1st intercarpal joint.      Electronically signed by: Harpal  MD Gallo  Date:    05/04/2024  Time:    11:11               Narrative:    EXAMINATION:  XR WRIST COMPLETE 3 VIEWS RIGHT    CLINICAL HISTORY:  Unspecified fall, initial encounter    TECHNIQUE:  PA, lateral, and oblique views of the right wrist were performed.    COMPARISON:  None    FINDINGS:  There degenerative changes at the 1st intercarpal joint.  An acute fracture is not seen.  There is no dislocation.                        Wet Read by Minal Escalera MD (05/04/24 10:10:46, [facility] - Emergency Dept, Emergency Medicine)    Proximal 5th metacarpal fx                                     X-Ray Hand 3 view Right (Final result)  Result time 05/04/24 11:07:33      Final result by Harpal Holder MD (05/04/24 11:07:33)                   Impression:      Limited study, no obvious abnormalities are seen.      Electronically signed by: Harpal Holder MD  Date:    05/04/2024  Time:    11:07               Narrative:    EXAMINATION:  XR HAND COMPLETE 3 VIEW RIGHT    CLINICAL HISTORY:  fall;    TECHNIQUE:  PA, lateral, and oblique views of the right hand were performed.    COMPARISON:  None    FINDINGS:  There are no fractures seen.  There is no dislocation.  The fingers are overlapped on the lateral view which limits the study.                        Wet Read by Minal Escalera MD (05/04/24 10:10:56, [facility] Emergency Dept, Emergency Medicine)    Proximal 5th metacarpal fx                                     X-Ray Elbow Complete Right (Final result)  Result time 05/04/24 11:06:31      Final result by Harpal Holder MD (05/04/24 11:06:31)                   Impression:      Limited study, a fracture is not demonstrated.      Electronically signed by: Harpal Holder MD  Date:    05/04/2024  Time:    11:06               Narrative:    EXAMINATION:  XR ELBOW COMPLETE 3 VIEW RIGHT    CLINICAL HISTORY:  . Unspecified fall, initial encounter    TECHNIQUE:  AP, lateral, and  oblique views of the right elbow were performed.    COMPARISON:  None    FINDINGS:  Lateral film is rotated.  A definite fracture is not seen.  There is no dislocation.  An effusion is not demonstrated.                        Wet Read by Minal Escalera MD (05/04/24 10:11:04, [facility] - Emergency Dept, Emergency Medicine)    Negative                                  X-Rays:   Independently Interpreted Readings:   Other Readings:  On my preliminary reading, I suspected she had a proximal 5th fracture and this is right where she is the most tender.  However, radiologist has read the x-ray is degenerative changes.    Medications - No data to display  Medical Decision Making  See HPI for narrative    Differential diagnosis includes but is not limited to fracture, contusion, arthritic change, sprain    Problems Addressed:  Closed nondisplaced fracture of base of fifth metacarpal bone of right hand, initial encounter:     Details: On my reading of the x-ray, I felt she had a nondisplaced fracture of the proximal 5th metacarpal bone and she is point tender in this area.  However, the radiologist has read his x-ray as a degenerative change.  I did try to call the patient to let her know of the discrepancy but her phone number immediately hangs up.  She will be following up with the orthopedic doctor next week and I am sure will discuss the issue at that time.    Amount and/or Complexity of Data Reviewed  Radiology: ordered and independent interpretation performed.                                      Clinical Impression:  Final diagnoses:  [W19.XXXA] Fall  [S62.346A] Closed nondisplaced fracture of base of fifth metacarpal bone of right hand, initial encounter (Primary)          ED Disposition Condition    Discharge Stable          ED Prescriptions    None       Follow-up Information       Follow up With Specialties Details Why Contact Info    Candice Rowan, [facility]   [address] [place] [address]  [facility] [address]  [phone]      [facility]   Follow up requested              Minal Escalera MD  05/04/24 8016

## 2024-05-30 ENCOUNTER — HOSPITAL ENCOUNTER (EMERGENCY)
Facility: HOSPITAL | Age: 64
Discharge: HOME | End: 2024-05-31
Attending: EMERGENCY MEDICINE
Payer: MEDICAID

## 2024-05-30 VITALS
WEIGHT: 115 LBS | RESPIRATION RATE: 18 BRPM | HEIGHT: 60 IN | BODY MASS INDEX: 22.58 KG/M2 | HEART RATE: 79 BPM | DIASTOLIC BLOOD PRESSURE: 75 MMHG | OXYGEN SATURATION: 98 % | SYSTOLIC BLOOD PRESSURE: 125 MMHG | TEMPERATURE: 98 F

## 2024-05-30 DIAGNOSIS — R11.10 VOMITING: ICD-10-CM

## 2024-05-30 DIAGNOSIS — K59.00 CONSTIPATION, UNSPECIFIED CONSTIPATION TYPE: Primary | ICD-10-CM

## 2024-05-30 LAB
ALBUMIN SERPL-MCNC: 4.1 G/DL (ref 3.4–4.8)
ALBUMIN/GLOB SERPL: 1.1 RATIO (ref 1.1–2)
ALP SERPL-CCNC: 69 UNIT/L (ref 40–150)
ALT SERPL-CCNC: 16 UNIT/L (ref 0–55)
ANION GAP SERPL CALC-SCNC: 10 MEQ/L
AST SERPL-CCNC: 17 UNIT/L (ref 5–34)
BACTERIA #/AREA URNS AUTO: ABNORMAL /HPF
BASOPHILS # BLD AUTO: 0.05 X10(3)/MCL
BASOPHILS NFR BLD AUTO: 0.6 %
BILIRUB SERPL-MCNC: 0.2 MG/DL
BILIRUB UR QL STRIP.AUTO: NEGATIVE
BUN SERPL-MCNC: 19.3 MG/DL (ref 9.8–20.1)
CALCIUM SERPL-MCNC: 9.5 MG/DL (ref 8.4–10.2)
CHLORIDE SERPL-SCNC: 111 MMOL/L (ref 98–107)
CLARITY UR: CLEAR
CO2 SERPL-SCNC: 24 MMOL/L (ref 23–31)
COLOR UR AUTO: YELLOW
CREAT SERPL-MCNC: 0.68 MG/DL (ref 0.55–1.02)
CREAT/UREA NIT SERPL: 28
EOSINOPHIL # BLD AUTO: 0.06 X10(3)/MCL (ref 0–0.9)
EOSINOPHIL NFR BLD AUTO: 0.7 %
ERYTHROCYTE [DISTWIDTH] IN BLOOD BY AUTOMATED COUNT: 13.9 % (ref 11.5–17)
GFR SERPLBLD CREATININE-BSD FMLA CKD-EPI: >60 ML/MIN/1.73/M2
GLOBULIN SER-MCNC: 3.6 GM/DL (ref 2.4–3.5)
GLUCOSE SERPL-MCNC: 90 MG/DL (ref 82–115)
GLUCOSE UR QL STRIP: NEGATIVE
HCT VFR BLD AUTO: 38.3 % (ref 37–47)
HGB BLD-MCNC: 12.7 G/DL (ref 12–16)
HGB UR QL STRIP: ABNORMAL
IMM GRANULOCYTES # BLD AUTO: 0.05 X10(3)/MCL (ref 0–0.04)
IMM GRANULOCYTES NFR BLD AUTO: 0.6 %
KETONES UR QL STRIP: 15
LEUKOCYTE ESTERASE UR QL STRIP: NEGATIVE
LIPASE SERPL-CCNC: 25 U/L
LYMPHOCYTES # BLD AUTO: 2.66 X10(3)/MCL (ref 0.6–4.6)
LYMPHOCYTES NFR BLD AUTO: 29.5 %
MCH RBC QN AUTO: 28.2 PG (ref 27–31)
MCHC RBC AUTO-ENTMCNC: 33.2 G/DL (ref 33–36)
MCV RBC AUTO: 84.9 FL (ref 80–94)
MONOCYTES # BLD AUTO: 0.56 X10(3)/MCL (ref 0.1–1.3)
MONOCYTES NFR BLD AUTO: 6.2 %
MUCOUS THREADS URNS QL MICRO: ABNORMAL /LPF
NEUTROPHILS # BLD AUTO: 5.63 X10(3)/MCL (ref 2.1–9.2)
NEUTROPHILS NFR BLD AUTO: 62.4 %
NITRITE UR QL STRIP: NEGATIVE
NRBC BLD AUTO-RTO: 0 %
PH UR STRIP: 6 [PH]
PLATELET # BLD AUTO: 259 X10(3)/MCL (ref 130–400)
PMV BLD AUTO: 9.3 FL (ref 7.4–10.4)
POTASSIUM SERPL-SCNC: 3.7 MMOL/L (ref 3.5–5.1)
PROT SERPL-MCNC: 7.7 GM/DL (ref 5.8–7.6)
PROT UR QL STRIP: NEGATIVE
RBC # BLD AUTO: 4.51 X10(6)/MCL (ref 4.2–5.4)
RBC #/AREA URNS AUTO: ABNORMAL /HPF
SODIUM SERPL-SCNC: 145 MMOL/L (ref 136–145)
SP GR UR STRIP.AUTO: 1.02 (ref 1–1.03)
SQUAMOUS #/AREA URNS AUTO: ABNORMAL /HPF
UROBILINOGEN UR STRIP-ACNC: 0.2
WBC # SPEC AUTO: 9.01 X10(3)/MCL (ref 4.5–11.5)
WBC #/AREA URNS AUTO: ABNORMAL /HPF

## 2024-05-30 PROCEDURE — 93005 ELECTROCARDIOGRAM TRACING: CPT

## 2024-05-30 PROCEDURE — 25000003 PHARM REV CODE 250: Performed by: EMERGENCY MEDICINE

## 2024-05-30 PROCEDURE — 80053 COMPREHEN METABOLIC PANEL: CPT | Performed by: EMERGENCY MEDICINE

## 2024-05-30 PROCEDURE — 96360 HYDRATION IV INFUSION INIT: CPT

## 2024-05-30 PROCEDURE — 81003 URINALYSIS AUTO W/O SCOPE: CPT | Performed by: EMERGENCY MEDICINE

## 2024-05-30 PROCEDURE — 83690 ASSAY OF LIPASE: CPT | Performed by: EMERGENCY MEDICINE

## 2024-05-30 PROCEDURE — 93010 ELECTROCARDIOGRAM REPORT: CPT | Mod: ,,, | Performed by: INTERNAL MEDICINE

## 2024-05-30 PROCEDURE — 85025 COMPLETE CBC W/AUTO DIFF WBC: CPT | Performed by: EMERGENCY MEDICINE

## 2024-05-30 PROCEDURE — 99285 EMERGENCY DEPT VISIT HI MDM: CPT | Mod: 25

## 2024-05-30 RX ORDER — LIDOCAINE HYDROCHLORIDE 20 MG/ML
15 SOLUTION OROPHARYNGEAL ONCE
Status: COMPLETED | OUTPATIENT
Start: 2024-05-31 | End: 2024-05-30

## 2024-05-30 RX ORDER — SODIUM CHLORIDE 9 MG/ML
1000 INJECTION, SOLUTION INTRAVENOUS
Status: COMPLETED | OUTPATIENT
Start: 2024-05-30 | End: 2024-05-31

## 2024-05-30 RX ORDER — ALUMINUM HYDROXIDE, MAGNESIUM HYDROXIDE, AND SIMETHICONE 1200; 120; 1200 MG/30ML; MG/30ML; MG/30ML
30 SUSPENSION ORAL ONCE
Status: COMPLETED | OUTPATIENT
Start: 2024-05-31 | End: 2024-05-30

## 2024-05-30 RX ADMIN — ALUMINUM HYDROXIDE, MAGNESIUM HYDROXIDE, AND DIMETHICONE 30 ML: 200; 20; 200 SUSPENSION ORAL at 11:05

## 2024-05-30 RX ADMIN — LIDOCAINE HYDROCHLORIDE 15 ML: 20 SOLUTION ORAL at 11:05

## 2024-05-30 RX ADMIN — SODIUM CHLORIDE 1000 ML: 9 INJECTION, SOLUTION INTRAVENOUS at 11:05

## 2024-05-31 LAB
OHS QRS DURATION: 82 MS
OHS QTC CALCULATION: 414 MS

## 2024-05-31 PROCEDURE — 25000003 PHARM REV CODE 250: Performed by: EMERGENCY MEDICINE

## 2024-05-31 RX ORDER — SUCRALFATE 1 G/1
1 TABLET ORAL 4 TIMES DAILY
Qty: 28 TABLET | Refills: 0 | Status: SHIPPED | OUTPATIENT
Start: 2024-05-31 | End: 2024-06-07

## 2024-05-31 RX ORDER — SYRING-NEEDL,DISP,INSUL,0.3 ML 29 G X1/2"
296 SYRINGE, EMPTY DISPOSABLE MISCELLANEOUS ONCE
Qty: 296 ML | Refills: 0 | Status: SHIPPED | OUTPATIENT
Start: 2024-05-31 | End: 2024-05-31

## 2024-05-31 NOTE — ED PROVIDER NOTES
Encounter Date: 5/30/2024       History     Chief Complaint   Patient presents with    Abdominal Pain     Stomach pain/nausea x1 week with 1 episode of emesis Tuesday. Pt tried OTC MOM, and gas x without relief.      Patient is a 62 yo F presenting with constipation and mild intermittent abdominal pain x 5-7 days. Currently she has no pain. When pain is present, described as cramping/bubbling. She had a few episode of vomiting earlier in the week but non currently. No urinary symptoms. She did take OTC medication and had one small firm bowel movement that was normal in color. She denies any fevers, chills, chest pain, shortness of breath, diarrhea or other complaints. She does have  hx of reflux and would like to try a GI cocktail. They have otherwise been at their baseline health.           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 Fagan    THROAT SURGERY      cyst on vocal chord     No family history on file.  Social History     Tobacco Use    Smoking status: Every Day     Current packs/day: 0.50     Types: Cigarettes    Smokeless tobacco: Never   Substance Use Topics    Alcohol use: Yes    Drug use: Never     Review of Systems   Constitutional:  Negative for fever.   HENT:  Negative for sore throat.    Respiratory:  Negative for shortness of breath.    Cardiovascular:  Negative for chest pain.   Gastrointestinal:  Positive for constipation and nausea.   Genitourinary:  Negative for dysuria.   Musculoskeletal:  Negative for back pain.   Skin:  Negative for rash.   Neurological:  Negative for weakness.   Hematological:  Does not bruise/bleed easily.       Physical Exam     Initial Vitals [05/30/24 1921]   BP Pulse Resp Temp SpO2   125/75 79 18 98.3 °F (36.8 °C)  98 %      MAP       --         Physical Exam    Nursing note and vitals reviewed.  Constitutional: She appears well-developed and well-nourished. No distress.   HENT:   Head: Normocephalic and atraumatic.   Neck: Neck supple.   Cardiovascular:  Normal rate, regular rhythm and normal heart sounds.           No murmur heard.  Pulmonary/Chest: Breath sounds normal. No respiratory distress. She has no wheezes. She has no rhonchi.   Abdominal: Abdomen is soft. Bowel sounds are normal. She exhibits no distension. There is no abdominal tenderness. There is no rebound and no guarding.   Musculoskeletal:         General: No edema. Normal range of motion.      Cervical back: Neck supple.     Neurological: She is alert and oriented to person, place, and time.   Skin: Skin is warm and dry.   Psychiatric: She has a normal mood and affect. Thought content normal.         ED Course   Procedures  Labs Reviewed   COMPREHENSIVE METABOLIC PANEL - Abnormal; Notable for the following components:       Result Value    Chloride 111 (*)     Protein Total 7.7 (*)     Globulin 3.6 (*)     All other components within normal limits   URINALYSIS, REFLEX TO URINE CULTURE - Abnormal; Notable for the following components:    Ketones, UA 15 (*)     Blood, UA Small (*)     All other components within normal limits   CBC WITH DIFFERENTIAL - Abnormal; Notable for the following components:    IG# 0.05 (*)     All other components within normal limits   URINALYSIS, MICROSCOPIC - Abnormal; Notable for the following components:    Mucous, UA Moderate (*)     Squamous Epithelial Cells, UA Few (*)     All other components within normal limits   LIPASE - Normal   CBC W/ AUTO DIFFERENTIAL    Narrative:     The following orders were created for panel order CBC W/ AUTO DIFFERENTIAL.  Procedure                               Abnormality         Status                     ---------                               -----------         ------                     CBC  with Differential[[id number]]       Abnormal            Final result                 Please view results for these tests on the individual orders.     EKG Readings: (Independently Interpreted)   EKG Interpretation 1929    Rate: 60  Rhythm: NSR  QRS: WNL  Qtc: WNL  No signs of ischemia  Nonspecific T wave abnormalities present         Imaging Results              X-Ray Abdomen Flat And Erect (Final result)  Result time 05/30/24 23:17:22      Final result by Nick Pretty MD (05/30/24 23:17:22)                   Impression:      Nonspecific bowel gas pattern.      Electronically signed by: Nick Pretty  Date:    05/30/2024  Time:    23:17               Narrative:    EXAMINATION:  XR ABDOMEN FLAT AND ERECT    CLINICAL HISTORY:  Abdominal Pain;    TECHNIQUE:  Two views    COMPARISON:  June 8, 2013    FINDINGS:  There is colonic fecal loading throughout.  The intestinal gas pattern is nonspecific and nonobstructive. No air fluid levels or pneumoperitoneum identified.  Visualized portion of the lungs are clear.                                       Medications   0.9%  NaCl infusion (0 mLs Intravenous Stopped 5/31/24 0026)   aluminum-magnesium hydroxide-simethicone 200-200-20 mg/5 mL suspension 30 mL (30 mLs Oral Not Given 5/31/24 0045)     And   LIDOcaine viscous HCl 2% oral solution 15 mL (15 mLs Oral Not Given 5/31/24 0045)     Medical Decision Making  Amount and/or Complexity of Data Reviewed  Labs: ordered.  Radiology: ordered.    Risk  OTC drugs.  Prescription drug management.               ED Course as of 05/31/24 0347   Fri May 31, 2024   0005 Passed PO challenge. Feels better.  [GM]      ED Course User Index  [GM] Fadumo Womack MD                           Clinical Impression:  Final diagnoses:  [R11.10] Vomiting  [K59.00] Constipation, unspecified constipation type (Primary)          ED Disposition Condition    Discharge Stable          ED Prescriptions       Medication Sig Dispense Start Date End Date  Auth. Provider    sucralfate (CARAFATE) 1 gram tablet Take 1 tablet (1 g total) by mouth 4 (four) times daily. for 7 days 28 tablet 5/31/2024 6/7/2024 Fadumo Womack MD    magnesium citrate solution (Expires today) Take 296 mLs by mouth once. for 1 dose 296 mL 5/31/2024 5/31/2024 Fadumo Womack MD          Follow-up Information       Follow up With Specialties Details Why Contact Info    Candice Rowan FNP Family Medicine Schedule an appointment as soon as possible for a visit  If symptoms worsen, return to the ED [address] [facility] [id number] [phone]               Fadumo Womack MD  05/31/24 9948

## 2024-07-24 ENCOUNTER — HOSPITAL ENCOUNTER (EMERGENCY)
Facility: HOSPITAL | Age: 64
Discharge: HOME | End: 2024-07-24
Attending: EMERGENCY MEDICINE
Payer: MEDICAID

## 2024-07-24 VITALS
DIASTOLIC BLOOD PRESSURE: 72 MMHG | SYSTOLIC BLOOD PRESSURE: 130 MMHG | HEIGHT: 60 IN | OXYGEN SATURATION: 98 % | HEART RATE: 75 BPM | RESPIRATION RATE: 18 BRPM | WEIGHT: 114 LBS | TEMPERATURE: 99 F | BODY MASS INDEX: 22.38 KG/M2

## 2024-07-24 DIAGNOSIS — H61.22 IMPACTED CERUMEN OF LEFT EAR: Primary | ICD-10-CM

## 2024-07-24 PROCEDURE — 99282 EMERGENCY DEPT VISIT SF MDM: CPT

## 2024-07-24 NOTE — ED PROVIDER NOTES
"Encounter Date: 7/24/2024       History     Chief Complaint   Patient presents with    Cerumen Impaction     Pt c/o wax in left ear and decreased hearing as well.     The history is provided by the patient.   Otalgia  This is a new problem. The current episode started several days ago. There is pain in the left ear. The problem has been unchanged. Associated symptoms include hearing loss. Pertinent negatives include no sore throat and no rash.   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 Fagan    THROAT SURGERY      cyst on vocal chord     No family history on file.  Social History     Tobacco Use    Smoking status: Every Day     Current packs/day: 0.50     Types: Cigarettes    Smokeless tobacco: Never   Substance Use Topics    Alcohol use: Yes    Drug use: Never     Review of Systems   Constitutional:  Negative for fever.   HENT:  Positive for ear pain and hearing loss. Negative for sore throat.    Respiratory:  Negative for shortness of breath.    Cardiovascular:  Negative for chest pain.   Gastrointestinal:  Negative for nausea.   Genitourinary:  Negative for dysuria.   Musculoskeletal:  Negative for back pain.   Skin:  Negative for rash.   Neurological:  Negative for weakness.   Hematological:  Does not bruise/bleed easily.       Physical Exam     Initial Vitals [07/24/24 0823]   BP Pulse Resp Temp SpO2   131/74 75 20 97.2 °F (36.2 °C) 97 %      MAP       --         Physical Exam    Nursing note and vitals reviewed.  Constitutional: She appears well-developed and well-nourished.   HENT:   Head: Normocephalic and atraumatic.   Right Ear: External ear normal.   Left Ear: External ear normal.   Cerumen impaction noted in " left ear   Eyes: Conjunctivae and EOM are normal. Pupils are equal, round, and reactive to light.   Neck: Neck supple.   Normal range of motion.  Cardiovascular:  Normal rate, regular rhythm, normal heart sounds and intact distal pulses.           Pulmonary/Chest: Breath sounds normal.   Abdominal: Abdomen is soft. Bowel sounds are normal.   Musculoskeletal:         General: Normal range of motion.      Cervical back: Normal range of motion and neck supple.     Neurological: She is alert and oriented to person, place, and time. GCS score is 15. GCS eye subscore is 4. GCS verbal subscore is 5. GCS motor subscore is 6.   Skin: Skin is warm and dry. Capillary refill takes less than 2 seconds.   Psychiatric: She has a normal mood and affect. Her behavior is normal. Judgment and thought content normal.         ED Course   Procedures  Labs Reviewed - No data to display       Imaging Results    None          Medications - No data to display  Medical Decision Making  Risk  OTC drugs.       Differential includes:  cerumen impaction, OE, OM, foreign body.                Left EAC irrigated with roughly 100 mL of warm tap water and cerumen impaction was cleared.  TM appears normal.  Will D/C with Debrox drops                 Clinical Impression:  Final diagnoses:  [H61.22] Impacted cerumen of left ear (Primary)          ED Disposition Condition    Discharge Stable          ED Prescriptions       Medication Sig Dispense Start Date End Date Auth. Provider    carbamide peroxide (DEBROX) 6.5 % otic solution Place 5 drops into the left ear 2 (two) times daily. for 10 days 15 mL 7/24/2024 8/3/2024 Jake Sotomayor MD          Follow-up Information       Follow up With Specialties Details Why Contact Info    Candice Rowan FNP Family Medicine Schedule an appointment as soon as possible for a visit   [address] [place] [address]  [phone]               Jake Sotomayor MD  07/24/24 0614

## 2024-12-07 ENCOUNTER — HOSPITAL ENCOUNTER (EMERGENCY)
Facility: HOSPITAL | Age: 64
Discharge: HOME | End: 2024-12-07
Attending: EMERGENCY MEDICINE
Payer: MEDICAID

## 2024-12-07 VITALS
BODY MASS INDEX: 20.81 KG/M2 | SYSTOLIC BLOOD PRESSURE: 145 MMHG | RESPIRATION RATE: 18 BRPM | HEIGHT: 60 IN | DIASTOLIC BLOOD PRESSURE: 83 MMHG | WEIGHT: 106 LBS | TEMPERATURE: 98 F | HEART RATE: 83 BPM | OXYGEN SATURATION: 100 %

## 2024-12-07 DIAGNOSIS — R35.0 URINARY FREQUENCY: Primary | ICD-10-CM

## 2024-12-07 LAB
BACTERIA #/AREA URNS AUTO: NORMAL /HPF
BILIRUB UR QL STRIP.AUTO: NEGATIVE
CLARITY UR: CLEAR
COLOR UR AUTO: ABNORMAL
GLUCOSE UR QL STRIP: NEGATIVE
HGB UR QL STRIP: ABNORMAL
KETONES UR QL STRIP: NEGATIVE
LEUKOCYTE ESTERASE UR QL STRIP: NEGATIVE
NITRITE UR QL STRIP: NEGATIVE
PH UR STRIP: 8 [PH]
PROT UR QL STRIP: NEGATIVE
RBC #/AREA URNS AUTO: NORMAL /HPF
SP GR UR STRIP.AUTO: 1.02 (ref 1–1.03)
SQUAMOUS #/AREA URNS AUTO: NORMAL /HPF
UROBILINOGEN UR STRIP-ACNC: 0.2
WBC #/AREA URNS AUTO: NORMAL /HPF

## 2024-12-07 PROCEDURE — 81003 URINALYSIS AUTO W/O SCOPE: CPT

## 2024-12-07 PROCEDURE — 99282 EMERGENCY DEPT VISIT SF MDM: CPT

## 2024-12-07 RX ORDER — FAMOTIDINE 20 MG/1
20 TABLET, FILM COATED ORAL
COMMUNITY
Start: 2024-11-18

## 2024-12-07 NOTE — ED PROVIDER NOTES
Encounter Date: 12/7/2024       History     Chief Complaint   Patient presents with    Urinary Frequency     Pt complaint of urinary frequency     64 y.o.  female with a history of GERD and Hypertension presents to Emergency Department with a chief complaint of urinary frequency. Symptoms began 1 week ago and have been constant since onset. Associated symptoms include none. Symptoms are aggravated with urinating and there are no alleviating factors. The patient denies CP, SOB, diuretic use, dysuria, flank pain, or abdominal pain.. No other reported symptoms at this time      The history is provided by the patient. No  was used.   Urinary Frequency  This is a new problem. The current episode started more than 2 days ago. The problem occurs daily. The problem has not changed since onset.Pertinent negatives include no chest pain, no abdominal pain, no headaches and no shortness of breath. She has tried nothing for the symptoms.     Review of patient's allergies indicates:   Allergen Reactions    Iodine     Penicillins Rash     Past Medical History:   Diagnosis Date    GERD (gastroesophageal reflux disease)     Hypertension      Past Surgical History:   Procedure Laterality Date    cyst on vocal chord      HERNIA REPAIR Right     Inguinal Hernia repair 12/13 by Dr. Fagan    THROAT SURGERY      cyst on vocal chord     No family history on file.  Social History     Tobacco Use    Smoking status: Every Day     Current packs/day: 0.50     Types: Cigarettes    Smokeless tobacco: Never   Substance Use Topics    Alcohol use: Not Currently    Drug use: Never     Review of Systems   Constitutional:  Negative for diaphoresis, fatigue and fever.   Eyes:  Negative for photophobia and visual disturbance.   Respiratory:  Negative for cough, shortness of breath, wheezing and stridor.    Cardiovascular:  Negative for chest pain and leg swelling.   Gastrointestinal:  Negative for abdominal pain,  nausea and vomiting.   Genitourinary:  Positive for frequency. Negative for difficulty urinating, dysuria and pelvic pain.   Neurological:  Negative for syncope, speech difficulty, weakness and headaches.   All other systems reviewed and are negative.      Physical Exam     Initial Vitals [12/07/24 1324]   BP Pulse Resp Temp SpO2   (!) 145/83 83 18 98 °F (36.7 °C) 100 %      MAP       --         Physical Exam    Nursing note and vitals reviewed.  Constitutional: She appears well-developed and well-nourished. She is not diaphoretic. She is cooperative.  Non-toxic appearance. No distress.   HENT:   Head: Normocephalic and atraumatic.   Right Ear: External ear normal.   Left Ear: External ear normal.   Nose: Nose normal.   Eyes: Conjunctivae and EOM are normal. Pupils are equal, round, and reactive to light.   Neck: Neck supple.   Normal range of motion.  Cardiovascular:  Normal rate, regular rhythm, normal heart sounds, intact distal pulses and normal pulses.           Pulmonary/Chest: Effort normal and breath sounds normal. No tachypnea and no bradypnea. No respiratory distress. She has no decreased breath sounds. She has no wheezes. She has no rhonchi. She has no rales.   Abdominal: Abdomen is soft. Bowel sounds are normal. She exhibits no distension. There is no abdominal tenderness.   No tenderness. Abdomen is soft.    No right CVA tenderness.  No left CVA tenderness. There is no rebound and no guarding.   Musculoskeletal:         General: Normal range of motion.      Cervical back: Normal range of motion and neck supple.     Neurological: She is alert and oriented to person, place, and time. She has normal strength. No sensory deficit. GCS score is 15. GCS eye subscore is 4. GCS verbal subscore is 5. GCS motor subscore is 6.   Skin: Skin is warm and dry. Capillary refill takes less than 2 seconds.   Psychiatric: She has a normal mood and affect. Thought content normal.         ED Course   Procedures  Labs  Reviewed   URINALYSIS, REFLEX TO URINE CULTURE - Abnormal       Result Value    Color, UA Straw      Appearance, UA Clear      Specific Gravity, UA 1.020      pH, UA 8.0      Protein, UA Negative      Glucose, UA Negative      Ketones, UA Negative      Blood, UA Small (*)     Bilirubin, UA Negative      Urobilinogen, UA 0.2      Nitrites, UA Negative      Leukocyte Esterase, UA Negative     URINALYSIS, MICROSCOPIC - Normal    Bacteria, UA None Seen      RBC, UA 0-2      WBC, UA None Seen      Squamous Epithelial Cells, UA Rare            Imaging Results    None          Medications - No data to display  Medical Decision Making  Patient awake, alert, has non-labored breathing, and follows commands appropriately. Arrived to ED due to urinary frequency x1 week. Afebrile. Denies additional complaints. Patient reports she holds her urine during the night. NAD Noted.     Judging by the patient's chief complaint and pertinent history, the patient has the following possible differential diagnoses, including but not limited to the following: UTI, Polyuria, Urethritis     Some of these are deemed to be lower likelihood and some more likely based on my physical exam and history combined with possible lab work and/or imaging studies. Please see the pertinent studies, and refer to the HPI. Some of these diagnoses will take further evaluation to fully rule out, perhaps as an outpatient and the patient was encouraged to follow up when discharged for more comprehensive evaluation.       Amount and/or Complexity of Data Reviewed  Labs: ordered.     Details: UA unremarkable. Informed patient of results.   Discussion of management or test interpretation with external provider(s): Discussed plan of care and interventions with patient. Agreed to and aware of plan of care. Comfortable being discharged home. Patient discharged home. Patient denies new or additional complaints; no further tests indicated at this time. Verbalized  understanding of instructions. No emergent or apparent distress noted prior to discharge. Strict ER return precautions given.       Risk  OTC drugs.               ED Course as of 12/07/24 1412   Sat Dec 07, 2024   1411 Patient's UA unremarkable, discussed results with patient. Patient reports she has a scheduled appointment with PCP on Monday, instructed to f/u. At disposition, patient has no additional complaints, has no flank or abdominal pain, VSS, afebrile, and labs negative. Stable for discharge home.  [JA]      ED Course User Index  [JA] Dora Hugo, DANIELLA                           Clinical Impression:  Final diagnoses:  [R35.0] Urinary frequency (Primary)          ED Disposition Condition    Discharge Stable          ED Prescriptions    None       Follow-up Information       Follow up With Specialties Details Why Contact Info    Candice Rowan FNP Family Medicine Go on 12/9/2024 for scheduled appointment. [address] [facility] [address]  [phone]      [facility] - Emergency Dept Emergency Medicine Go to  If symptoms worsen, As needed [id number]  [facility] [id number] [phone]             Dora Hugo, NP  12/07/24 1415

## 2024-12-24 ENCOUNTER — HOSPITAL ENCOUNTER (EMERGENCY)
Facility: HOSPITAL | Age: 64
Discharge: HOME | End: 2024-12-24
Attending: EMERGENCY MEDICINE
Payer: MEDICAID

## 2024-12-24 VITALS
DIASTOLIC BLOOD PRESSURE: 82 MMHG | RESPIRATION RATE: 18 BRPM | HEIGHT: 60 IN | WEIGHT: 110 LBS | SYSTOLIC BLOOD PRESSURE: 152 MMHG | BODY MASS INDEX: 21.6 KG/M2 | HEART RATE: 82 BPM | OXYGEN SATURATION: 98 % | TEMPERATURE: 98 F

## 2024-12-24 DIAGNOSIS — J02.9 SORE THROAT: Primary | ICD-10-CM

## 2024-12-24 DIAGNOSIS — E04.1 THYROID NODULE: ICD-10-CM

## 2024-12-24 DIAGNOSIS — K02.9 PAIN DUE TO DENTAL CARIES: ICD-10-CM

## 2024-12-24 LAB
ALBUMIN SERPL-MCNC: 4.1 G/DL (ref 3.4–4.8)
ALBUMIN/GLOB SERPL: 1.2 RATIO (ref 1.1–2)
ALP SERPL-CCNC: 76 UNIT/L (ref 40–150)
ALT SERPL-CCNC: 12 UNIT/L (ref 0–55)
ANION GAP SERPL CALC-SCNC: 10 MEQ/L
AST SERPL-CCNC: 16 UNIT/L (ref 5–34)
BASOPHILS # BLD AUTO: 0.05 X10(3)/MCL
BASOPHILS NFR BLD AUTO: 0.5 %
BILIRUB SERPL-MCNC: 0.3 MG/DL
BUN SERPL-MCNC: 15.2 MG/DL (ref 9.8–20.1)
CALCIUM SERPL-MCNC: 9.5 MG/DL (ref 8.4–10.2)
CHLORIDE SERPL-SCNC: 108 MMOL/L (ref 98–107)
CO2 SERPL-SCNC: 26 MMOL/L (ref 23–31)
CREAT SERPL-MCNC: 0.75 MG/DL (ref 0.55–1.02)
CREAT/UREA NIT SERPL: 20
EOSINOPHIL # BLD AUTO: 0.02 X10(3)/MCL (ref 0–0.9)
EOSINOPHIL NFR BLD AUTO: 0.2 %
ERYTHROCYTE [DISTWIDTH] IN BLOOD BY AUTOMATED COUNT: 13.9 % (ref 11.5–17)
GFR SERPLBLD CREATININE-BSD FMLA CKD-EPI: >60 ML/MIN/1.73/M2
GLOBULIN SER-MCNC: 3.5 GM/DL (ref 2.4–3.5)
GLUCOSE SERPL-MCNC: 95 MG/DL (ref 82–115)
HCT VFR BLD AUTO: 41.5 % (ref 37–47)
HGB BLD-MCNC: 13.5 G/DL (ref 12–16)
IMM GRANULOCYTES # BLD AUTO: 0.02 X10(3)/MCL (ref 0–0.04)
IMM GRANULOCYTES NFR BLD AUTO: 0.2 %
LYMPHOCYTES # BLD AUTO: 2.65 X10(3)/MCL (ref 0.6–4.6)
LYMPHOCYTES NFR BLD AUTO: 28.9 %
MCH RBC QN AUTO: 28 PG (ref 27–31)
MCHC RBC AUTO-ENTMCNC: 32.5 G/DL (ref 33–36)
MCV RBC AUTO: 86.1 FL (ref 80–94)
MONOCYTES # BLD AUTO: 0.56 X10(3)/MCL (ref 0.1–1.3)
MONOCYTES NFR BLD AUTO: 6.1 %
NEUTROPHILS # BLD AUTO: 5.88 X10(3)/MCL (ref 2.1–9.2)
NEUTROPHILS NFR BLD AUTO: 64.1 %
NRBC BLD AUTO-RTO: 0 %
PLATELET # BLD AUTO: 239 X10(3)/MCL (ref 130–400)
PMV BLD AUTO: 9.1 FL (ref 7.4–10.4)
POTASSIUM SERPL-SCNC: 3.6 MMOL/L (ref 3.5–5.1)
PROT SERPL-MCNC: 7.6 GM/DL (ref 5.8–7.6)
RBC # BLD AUTO: 4.82 X10(6)/MCL (ref 4.2–5.4)
SODIUM SERPL-SCNC: 144 MMOL/L (ref 136–145)
STREP A PCR (OHS): NOT DETECTED
T4 SERPL-MCNC: 12.04 UG/DL (ref 4.87–11.72)
TSH SERPL-ACNC: 0.55 UIU/ML (ref 0.35–4.94)
WBC # BLD AUTO: 9.18 X10(3)/MCL (ref 4.5–11.5)

## 2024-12-24 PROCEDURE — 87651 STREP A DNA AMP PROBE: CPT | Performed by: EMERGENCY MEDICINE

## 2024-12-24 PROCEDURE — 96375 TX/PRO/DX INJ NEW DRUG ADDON: CPT

## 2024-12-24 PROCEDURE — 80053 COMPREHEN METABOLIC PANEL: CPT | Performed by: EMERGENCY MEDICINE

## 2024-12-24 PROCEDURE — 85025 COMPLETE CBC W/AUTO DIFF WBC: CPT | Performed by: EMERGENCY MEDICINE

## 2024-12-24 PROCEDURE — 84436 ASSAY OF TOTAL THYROXINE: CPT | Performed by: EMERGENCY MEDICINE

## 2024-12-24 PROCEDURE — 36415 COLL VENOUS BLD VENIPUNCTURE: CPT | Performed by: EMERGENCY MEDICINE

## 2024-12-24 PROCEDURE — 63600175 PHARM REV CODE 636 W HCPCS: Performed by: EMERGENCY MEDICINE

## 2024-12-24 PROCEDURE — 99285 EMERGENCY DEPT VISIT HI MDM: CPT | Mod: 25

## 2024-12-24 PROCEDURE — 25500020 PHARM REV CODE 255: Performed by: EMERGENCY MEDICINE

## 2024-12-24 PROCEDURE — 84443 ASSAY THYROID STIM HORMONE: CPT | Performed by: EMERGENCY MEDICINE

## 2024-12-24 PROCEDURE — 96374 THER/PROPH/DIAG INJ IV PUSH: CPT

## 2024-12-24 RX ORDER — METHYLPREDNISOLONE SOD SUCC 125 MG
125 VIAL (EA) INJECTION
Status: COMPLETED | OUTPATIENT
Start: 2024-12-24 | End: 2024-12-24

## 2024-12-24 RX ORDER — DIPHENHYDRAMINE HYDROCHLORIDE 50 MG/ML
25 INJECTION INTRAMUSCULAR; INTRAVENOUS
Status: COMPLETED | OUTPATIENT
Start: 2024-12-24 | End: 2024-12-24

## 2024-12-24 RX ORDER — CLINDAMYCIN HYDROCHLORIDE 300 MG/1
300 CAPSULE ORAL EVERY 8 HOURS
Qty: 21 CAPSULE | Refills: 0 | Status: SHIPPED | OUTPATIENT
Start: 2024-12-24 | End: 2024-12-31

## 2024-12-24 RX ADMIN — IOHEXOL 75 ML: 350 INJECTION, SOLUTION INTRAVENOUS at 12:12

## 2024-12-24 RX ADMIN — DIPHENHYDRAMINE HYDROCHLORIDE 25 MG: 50 INJECTION INTRAMUSCULAR; INTRAVENOUS at 12:12

## 2024-12-24 RX ADMIN — METHYLPREDNISOLONE SODIUM SUCCINATE 125 MG: 125 INJECTION, POWDER, FOR SOLUTION INTRAMUSCULAR; INTRAVENOUS at 12:12

## 2024-12-24 NOTE — ED PROVIDER NOTES
Encounter Date: 12/24/2024       History     Chief Complaint   Patient presents with    Sore Throat     Pt complaint of sore throat     Patient is a 64-year-old female with a past medical history of reflux and hypertension presenting with sore throat and neck mass that was first noted 2 days ago.  She is a smoker.  She denies any previous history of thyroid cancer.  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  She states that she does not have difficulty swallowing but she finds it very uncomfortable.  No shortness a breath.  She noted that she had some swelling to the left lower portion of her neck that feels very firm to touch.  Never had that happen before.  She denies any cough, cold, rhinorrhea ,or other complaints.  No sick contacts that she is aware of. She is also having some left lower mild dental pain. No fevers.       Dictation #1  MRN:[id number]  CSN:[id number]   Review of patient's allergies indicates:   Allergen Reactions    Iodine     Penicillins Rash     Past Medical History:   Diagnosis Date    GERD (gastroesophageal reflux disease)     Hypertension      Past Surgical History:   Procedure Laterality Date    cyst on vocal chord      HERNIA REPAIR Right     Inguinal Hernia repair 12/13 by Dr. Fagan    THROAT SURGERY      cyst on vocal chord     No family history on file.  Social History     Tobacco Use    Smoking status: Every Day     Current packs/day: 0.50     Types: Cigarettes    Smokeless tobacco: Never   Substance Use Topics    Alcohol use: Not Currently    Drug use: Never     Review of Systems   Constitutional:  Negative for fever.   HENT:  Positive for dental problem and sore throat.    Respiratory:  Negative for shortness of breath.    Cardiovascular:  Negative for chest pain.   Gastrointestinal:  Negative for nausea.   Genitourinary:  Negative for dysuria.   Musculoskeletal:  Negative for back pain.   Skin:  Negative for rash.   Neurological:  Negative  for weakness.   Hematological:  Does not bruise/bleed easily.       Physical Exam     Initial Vitals [12/24/24 1039]   BP Pulse Resp Temp SpO2   (!) 152/82 82 18 98.4 °F (36.9 °C) 98 %      MAP       --         Physical Exam    Nursing note and vitals reviewed.  Constitutional: She appears well-developed and well-nourished. No distress.   HENT:   Head: Normocephalic and atraumatic.   Mild dental caries  Area of swelling over the left lower anterior neck. Firm to touch, over the thyroid.    Neck: Neck supple.   Cardiovascular:  Normal rate, regular rhythm and normal heart sounds.           No murmur heard.  Pulmonary/Chest: Breath sounds normal. No respiratory distress. She has no wheezes. She has no rhonchi.   Musculoskeletal:         General: No edema. Normal range of motion.      Cervical back: Neck supple.     Neurological: She is alert and oriented to person, place, and time.   Skin: Skin is warm and dry.   Psychiatric: She has a normal mood and affect. Thought content normal.         ED Course   Procedures  Labs Reviewed   COMPREHENSIVE METABOLIC PANEL - Abnormal       Result Value    Sodium 144      Potassium 3.6      Chloride 108 (*)     CO2 26      Glucose 95      Blood Urea Nitrogen 15.2      Creatinine 0.75      Calcium 9.5      Protein Total 7.6      Albumin 4.1      Globulin 3.5      Albumin/Globulin Ratio 1.2      Bilirubin Total 0.3      ALP 76      ALT 12      AST 16      eGFR >60      Anion Gap 10.0      BUN/Creatinine Ratio 20     T4 (IN-HOUSE) - Abnormal    Thyroxine 12.04 (*)    CBC WITH DIFFERENTIAL - Abnormal    WBC 9.18      RBC 4.82      Hgb 13.5      Hct 41.5      MCV 86.1      MCH 28.0      MCHC 32.5 (*)     RDW 13.9      Platelet 239      MPV 9.1      Neut % 64.1      Lymph % 28.9      Mono % 6.1      Eos % 0.2      Basophil % 0.5      Lymph # 2.65      Neut # 5.88      Mono # 0.56      Eos # 0.02      Baso # 0.05      IG# 0.02      IG% 0.2      NRBC% 0.0     STREP GROUP A BY PCR - Normal     STREP A PCR ([facility]) Not Detected      Narrative:     The Xpert Xpress Strep A test is a rapid, qualitative in vitro diagnostic test for the detection of Streptococcus pyogenes (Group A ß-hemolytic Streptococcus, Strep A) in throat swab specimens from patients with signs and symptoms of pharyngitis.     TSH - Normal    TSH 0.552     CBC W/ AUTO DIFFERENTIAL    Narrative:     The following orders were created for panel order CBC auto differential.  Procedure                               Abnormality         Status                     ---------                               -----------         ------                     CBC with Differential[[id number]]       Abnormal            Final result                 Please view results for these tests on the individual orders.          Imaging Results              CT Soft Tissue Neck With Contrast (Final result)  Result time 12/24/24 13:08:57      Final result by Marjan Garcia MD (12/24/24 13:08:57)                   Impression:      1. Mild asymmetric tonsillar soft tissue fullness on the right.  No appreciable rim enhancing abscess.  Evaluation is limited by motion artifact.  2. Left thyroid nodule.  Recommend nonemergent, outpatient thyroid sonogram.      Electronically signed by: Marjan Garcia  Date:    12/24/2024  Time:    13:08               Narrative:    EXAMINATION:  CT SOFT TISSUE NECK WITH CONTRAST    CLINICAL HISTORY:  left lower neck mass;    TECHNIQUE:  Low-dose helical acquired CT images through the neck were obtained following intravenous administration of contrast iodinated.  Axial, sagittal and coronal reformations.    Automated exposure control, adjustment of mA/kV and/or iterative reconstruction was utilized to limit radiation dose.    DLP: 227 mGycm.    COMPARISON:  None    FINDINGS:  LIMITATIONS: Motion degraded exam.    PHARYNGEAL SOFT TISSUES: Mild tonsillar fullness on the right.  No appreciable rim enhancing abscess.    ORAL CAVITY:  Normal    LARYNX: Normal supraglottic, glottic and subglottic larynx.    LYMPH NODES: No suspicious lymph nodes.    SALIVARY GLANDS: Normal parotid, submandibular and sublingual glands.    THYROID: There is a 3.5 cm left thyroid nodule.    VESSELS AND CAROTID SPACE: Normal.    SOFT TISSUES: No fluid collection or abscess.    BONES: No acute osseous abnormality.    LUNG APICES: Well aerated.                                       Medications   diphenhydrAMINE injection 25 mg (25 mg Intravenous Given 12/24/24 1219)   methylPREDNISolone sodium succinate injection 125 mg (125 mg Intravenous Given 12/24/24 1219)   iohexoL (OMNIPAQUE 350) injection 75 mL (75 mLs Intravenous Given 12/24/24 1236)     Medical Decision Making  Differentials considered but not limited to pharyngitis/viral illness, strep throat, allergies, sinusitis, thyroid mass, cyst, neck cancer.         Problems Addressed:  Pain due to dental caries: acute illness or injury  Sore throat: acute illness or injury     Details: CT showed mild fullness of the soft tissue of the peritonsillar region without definitive abscess.  We will go ahead and cover for possible strep with clindamycin.  Thyroid nodule: undiagnosed new problem with uncertain prognosis     Details: Patient informed of finding and instructed to follow up with primary care doctor for further workup including ultrasound    Amount and/or Complexity of Data Reviewed  Labs: ordered.  Radiology: ordered.    Risk  Prescription drug management.               ED Course as of 12/28/24 0614   Tue Dec 24, 2024   1043 Patient declines Covid/flu swab [GM]   1120 CT when Cr returns [GM]   1151 Pretreatment ordered for CT. Patient reports hx of itching with IV contrast.  [GM]   1325 Results were reviewed with patient. Questions were answered along with a discussion of signs and symptoms to return for. Patient comfortable with plan of discharge.   [GM]      ED Course User Index  [GM] Fadumo Womack MD                            Clinical Impression:  Final diagnoses:  [J02.9] Sore throat (Primary)  [E04.1] Thyroid nodule  [K02.9] Pain due to dental caries          ED Disposition Condition    Discharge Stable          ED Prescriptions       Medication Sig Dispense Start Date End Date Auth. Provider    clindamycin (CLEOCIN) 300 MG capsule Take 1 capsule (300 mg total) by mouth every 8 (eight) hours. for 7 days 21 capsule 12/24/2024 12/31/2024 Fadumo Womack MD          Follow-up Information       Follow up With Specialties Details Why Contact Info    Candice Rowan FNP Family Medicine Schedule an appointment as soon as possible for a visit  If symptoms worsen, reutrn to the ED [address] [facility] [address] [phone]               Fadumo Womack MD  12/28/24 0545

## 2024-12-24 NOTE — Clinical Note
"Becky"Mercedes Herndon was seen and treated in our emergency department on 12/24/2024.  She may return to work on 12/27/2024.       If you have any questions or concerns, please don't hesitate to call.      Fadumo Womack MD"